# Patient Record
Sex: MALE | Race: OTHER | HISPANIC OR LATINO | URBAN - METROPOLITAN AREA
[De-identification: names, ages, dates, MRNs, and addresses within clinical notes are randomized per-mention and may not be internally consistent; named-entity substitution may affect disease eponyms.]

---

## 2020-10-24 ENCOUNTER — EMERGENCY (EMERGENCY)
Facility: HOSPITAL | Age: 30
LOS: 1 days | Discharge: HOME | End: 2020-10-24
Attending: EMERGENCY MEDICINE | Admitting: EMERGENCY MEDICINE
Payer: MEDICAID

## 2020-10-24 VITALS
SYSTOLIC BLOOD PRESSURE: 109 MMHG | HEART RATE: 84 BPM | TEMPERATURE: 98 F | DIASTOLIC BLOOD PRESSURE: 75 MMHG | OXYGEN SATURATION: 100 % | RESPIRATION RATE: 18 BRPM

## 2020-10-24 PROCEDURE — 99283 EMERGENCY DEPT VISIT LOW MDM: CPT

## 2020-10-24 RX ORDER — IBUPROFEN 200 MG
600 TABLET ORAL ONCE
Refills: 0 | Status: COMPLETED | OUTPATIENT
Start: 2020-10-24 | End: 2020-10-24

## 2020-10-24 RX ORDER — OXYCODONE HYDROCHLORIDE 5 MG/1
1 TABLET ORAL
Qty: 9 | Refills: 0
Start: 2020-10-24 | End: 2020-10-26

## 2020-10-24 RX ORDER — ACETAMINOPHEN 500 MG
975 TABLET ORAL ONCE
Refills: 0 | Status: COMPLETED | OUTPATIENT
Start: 2020-10-24 | End: 2020-10-24

## 2020-10-24 RX ORDER — LIDOCAINE 4 G/100G
1 CREAM TOPICAL ONCE
Refills: 0 | Status: COMPLETED | OUTPATIENT
Start: 2020-10-24 | End: 2020-10-24

## 2020-10-24 RX ORDER — METOCLOPRAMIDE HCL 10 MG
10 TABLET ORAL ONCE
Refills: 0 | Status: DISCONTINUED | OUTPATIENT
Start: 2020-10-24 | End: 2020-10-24

## 2020-10-24 RX ORDER — KETOROLAC TROMETHAMINE 30 MG/ML
15 SYRINGE (ML) INJECTION ONCE
Refills: 0 | Status: DISCONTINUED | OUTPATIENT
Start: 2020-10-24 | End: 2020-10-24

## 2020-10-24 RX ORDER — DIAZEPAM 5 MG
1 TABLET ORAL
Qty: 9 | Refills: 0
Start: 2020-10-24 | End: 2020-10-26

## 2020-10-24 RX ORDER — SODIUM CHLORIDE 9 MG/ML
1000 INJECTION INTRAMUSCULAR; INTRAVENOUS; SUBCUTANEOUS ONCE
Refills: 0 | Status: DISCONTINUED | OUTPATIENT
Start: 2020-10-24 | End: 2020-10-24

## 2020-10-24 RX ADMIN — LIDOCAINE 1 PATCH: 4 CREAM TOPICAL at 10:10

## 2020-10-24 RX ADMIN — Medication 600 MILLIGRAM(S): at 10:09

## 2020-10-24 RX ADMIN — Medication 975 MILLIGRAM(S): at 10:09

## 2020-10-24 NOTE — ED PROVIDER NOTE - PATIENT PORTAL LINK FT
You can access the FollowMyHealth Patient Portal offered by [facility] by registering at the following website: [email or web address]. By joining [employer]’s FollowMyHealth portal, you will also be able to view your health information using other applications (apps) compatible with our system.

## 2020-10-24 NOTE — ED PROVIDER NOTE - PROGRESS NOTE DETAILS
Junior Saleem MD. pt reassessed. able to ambulate. states headache was much improved; back pain still present but improved. pt to be dsicharged. strict return precautions provided. rx sent for oxy and valium. advised for prn severe pain and not to drive or operate heavy machinery while taking these meds. all questions answered

## 2020-10-24 NOTE — ED PROVIDER NOTE - CARE PLAN
Principal Discharge DX:	Acute bilateral low back pain without sciatica  Secondary Diagnosis:	Headache

## 2020-10-24 NOTE — ED PROVIDER NOTE - CLINICAL SUMMARY MEDICAL DECISION MAKING FREE TEXT BOX
Junior Saleem MD. pt with no significant pmhx presents to ED for atraumatic lower back pain since yesterday. also woke up with HA, gradually worsening throughout day. it is not worse HA of his life. denies red flag sx for back pain; not an IVDU. neuro intact. able to ambulate. likely tension ha and muscle spasm/strain of back. will tx w/ po meds, reassess.

## 2020-10-24 NOTE — ED PROVIDER NOTE - OBJECTIVE STATEMENT
30 M with no significant pmhx presents to ED for b/l lower back pain and frontal HA that began when he woke up yesterday. It was mild at first but gradually worsened throughout the day. Today, pain was not relieved w/ tylenol and thus presents. States his back pain is worse w/ any movements. Denies fall/trauma/injury. Has some nausea and feels pressure behind his eyes. Denies vomiting, fever, neck pain, cough, abd pain, cp, sob, dysuria, hematuria, urinary incontinence, bowel incontinence, urinary retention, ivdu, night sweats, dizziness, syncope, numbness, weakness, tingling, diplopia, blurred vision.

## 2020-10-24 NOTE — ED PROVIDER NOTE - NS ED ROS FT
Constitutional: no fevers; no chills  HEENT: no visual changes, no sore throat, no rhinorrhea  CV: no cp; no palpitations  Resp: no sob; no cough  GI: no abd pain, nausea, no vomiting, no diarrhea, no constipation  : no dysuria, no hematuria  MSK: myalgais; arthralgias  skin: no rashes  neuro: HA, no numbness; no weakness, no tingling  ROS statement: all other ROS negative except as per HPI

## 2020-10-24 NOTE — ED ADULT TRIAGE NOTE - CHIEF COMPLAINT QUOTE
Pt c/o lower back pain radiating down both of his legs, atraumatic since yesterday, accompanied with headache, pt no past medical history, denies dizziness, no dysuria/hematuria.

## 2020-10-24 NOTE — ED PROVIDER NOTE - NSFOLLOWUPINSTRUCTIONS_ED_ALL_ED_FT
You may take Acetaminophen over the counter as needed for pain and/or fever. Use as directed and see medication warnings.  You may take Ibuprofen over the counter as needed for pain and/or fever. Use as directed and see medication warnings.    A prescription for __ and __    Please follow up with your primary care physician.  Please bring a copy of your results with you.  Please return to the emergency department for worsening of your symptoms. You were seen in the emergency department for low back pain and headache.     You may take Acetaminophen over the counter as needed for pain and/or fever. Use as directed and see medication warnings.  You may take Ibuprofen over the counter as needed for pain and/or fever. Use as directed and see medication warnings.    A prescription for Valium and Oxycodone were sent to your pharmacy. Please take it for severe pain. Do not operate heavy machinery, drive, or drink alcohol while taking these medications. Do not take both at the same time.     Please follow up with your primary care physician.    Please return to the emergency department for worsening of your symptoms.

## 2020-10-24 NOTE — ED PROVIDER NOTE - PHYSICAL EXAMINATION
PHYSICAL EXAM:  GENERAL: non-toxic appearing; in no respiratory distress  HEAD Atraumatic, Normocephalic  NECK: No JVD; FROM  EYES: PERRL, EOMs intact b/l w/out deficits; no nystagmus;   CHEST/LUNG: CTAB no wheezes/rhonchi/rales  HEART: RRR no murmur/gallops/rubs  ABDOMEN: +BS, soft, NT, ND  EXTREMITIES: No LE edema, +2 radial pulses b/l, +2 DP/PT pulses b/l  MUSCULOSKELETAL: FROM of all 4 extremities; no midline vertebral tenderness; no stepoffs or deformities; no tender to lower back;   NERVOUS SYSTEM:  A&Ox3, No motor deficits or sensory deficits; CNII-XII intact; no focal neurologic deficits; able to ambulate; no dysmetria; no dysdiadochokinesia; able to ambulate w/o difficulty;   SKIN:  No new rashes

## 2020-10-24 NOTE — ED PROVIDER NOTE - ATTENDING CONTRIBUTION TO CARE
DR. BYRNES, ATTENDING MD-  I performed a face to face bedside interview with the patient regarding history of present illness, review of symptoms and past medical history. I completed an independent physical exam.  I have discussed the patient's plan of care with the resident.   Documentation as above in the note.    31 y/o male with gradual onset low back pain and ha since yesterday.  Similar to episodes in past but worse in severity and prolonged in duration.  No red flags for ha or back pain.  Will tx symptomatically, give ha center and back pain center referral sheets for continued o/p w/u.

## 2023-09-02 ENCOUNTER — EMERGENCY (EMERGENCY)
Facility: HOSPITAL | Age: 33
LOS: 1 days | Discharge: HOME | End: 2023-09-02
Attending: EMERGENCY MEDICINE
Payer: MEDICAID

## 2023-09-02 VITALS
HEIGHT: 69 IN | OXYGEN SATURATION: 99 % | DIASTOLIC BLOOD PRESSURE: 74 MMHG | SYSTOLIC BLOOD PRESSURE: 111 MMHG | TEMPERATURE: 98 F | HEART RATE: 60 BPM | WEIGHT: 130.07 LBS | RESPIRATION RATE: 18 BRPM

## 2023-09-02 PROCEDURE — 99284 EMERGENCY DEPT VISIT MOD MDM: CPT | Mod: 25

## 2023-09-02 PROCEDURE — 73130 X-RAY EXAM OF HAND: CPT | Mod: 26,LT

## 2023-09-02 RX ORDER — IBUPROFEN 200 MG
600 TABLET ORAL ONCE
Refills: 0 | Status: COMPLETED | OUTPATIENT
Start: 2023-09-02 | End: 2023-09-02

## 2023-09-02 NOTE — ED PROVIDER NOTE - OBJECTIVE STATEMENT
33-year-old man presenting for evaluation of left third finger injury.  Was working on a car earlier today when a  jumped off a bolt and hit him in the finger.  He is reporting pain at his proximal interphalangeal joint.  He is reporting ROM is limited secondary to pain.  He has not taken any medications for pain today.  He reports his Tdap is up-to-date within the last 2 years.

## 2023-09-02 NOTE — ED PROVIDER NOTE - CLINICAL SUMMARY MEDICAL DECISION MAKING FREE TEXT BOX
Patient presenting for evaluation of finger injury over the joint.  Will obtain x-ray to screen for underlying fracture.  No obvious joint space involvement but given proximity anticipate discharge on antibiotics to prevent infection.  May require 1 suture for repair.

## 2023-09-02 NOTE — ED PROVIDER NOTE - PATIENT PORTAL LINK FT
You can access the FollowMyHealth Patient Portal offered by [facility] by registering at the following website: [email or web address]. By joining [employer]’s FollowMyHealth portal, you will also be able to view your health information using other applications (apps) compatible with our system.

## 2023-09-02 NOTE — ED ADULT TRIAGE NOTE - CHIEF COMPLAINT QUOTE
Pt stated he was using a  to cut off a bolt off the car and the  slipped and cut 2nd finger on his left hand around 6:30 pm tonight, was seen at urgent care for same c/o and referred to come to the ER.  Dressing in place.

## 2023-09-02 NOTE — ED PROVIDER NOTE - NSFOLLOWUPINSTRUCTIONS_ED_ALL_ED_FT
Thank you for choosing [facility] for your healthcare.    You were seen in the Emergency Department for an injury to your finger.  Here you had an x-ray which did not show any broken bones on our review.  If the radiologist's note a broken bone that we missed we will contact you to let you know of the difference.  We are starting you on antibiotics to try and make sure that there is no infection in the joint underlying the cut that you sustained.  If you start having severe pain in the finger, severe swelling of the joint, fevers please return to the emergency room for reevaluation.  You can also return to the emergency room for any further emergent or concerning medical issues.

## 2023-09-02 NOTE — ED PROVIDER NOTE - PHYSICAL EXAMINATION
Exam:  General: Patient well appearing, vital signs within normal limits  MSK: no deformity of finger, no swelling  Skin: small laceration/avulsion of skin over 3rd PIP, no obvious violation of joint space  Psych: normal mood and affect

## 2023-09-03 PROBLEM — Z78.9 OTHER SPECIFIED HEALTH STATUS: Chronic | Status: ACTIVE | Noted: 2020-10-24

## 2023-09-03 PROCEDURE — 99283 EMERGENCY DEPT VISIT LOW MDM: CPT | Mod: 25

## 2023-09-03 PROCEDURE — 73130 X-RAY EXAM OF HAND: CPT

## 2023-09-03 RX ADMIN — Medication 600 MILLIGRAM(S): at 00:21

## 2023-09-03 RX ADMIN — Medication 1 TABLET(S): at 01:44

## 2023-09-03 NOTE — ED ADULT NURSE NOTE - OBJECTIVE STATEMENT
Pt presents to ED AAOx4, Pt stated he was using a  to cut off a bolt off the car and the  slipped and cut 2nd finger on his left hand around 6:30 pm tonight, was seen at urgent care for same c/o and referred to come to the ER.  Dressing in place.

## 2023-09-03 NOTE — ED ADULT NURSE NOTE - NSFALLUNIVINTERV_ED_ALL_ED
Bed/Stretcher in lowest position, wheels locked, appropriate side rails in place/Call bell, personal items and telephone in reach/Instruct patient to call for assistance before getting out of bed/chair/stretcher/Non-slip footwear applied when patient is off stretcher/[place] to call system/Physically safe environment - no spills, clutter or unnecessary equipment/Purposeful proactive rounding/Room/bathroom lighting operational, light cord in reach

## 2025-02-03 ENCOUNTER — EMERGENCY (EMERGENCY)
Facility: HOSPITAL | Age: 35
LOS: 1 days | Discharge: HOME | End: 2025-02-03
Admitting: STUDENT IN AN ORGANIZED HEALTH CARE EDUCATION/TRAINING PROGRAM
Payer: MEDICAID

## 2025-02-03 VITALS
OXYGEN SATURATION: 99 % | HEART RATE: 114 BPM | RESPIRATION RATE: 20 BRPM | WEIGHT: 132.06 LBS | TEMPERATURE: 103 F | SYSTOLIC BLOOD PRESSURE: 110 MMHG | DIASTOLIC BLOOD PRESSURE: 69 MMHG

## 2025-02-03 VITALS
HEART RATE: 83 BPM | DIASTOLIC BLOOD PRESSURE: 59 MMHG | SYSTOLIC BLOOD PRESSURE: 92 MMHG | RESPIRATION RATE: 19 BRPM | TEMPERATURE: 98 F | OXYGEN SATURATION: 99 %

## 2025-02-03 LAB
ALBUMIN SERPL ELPH-MCNC: 4 G/DL — SIGNIFICANT CHANGE UP (ref 3.3–5)
ALP SERPL-CCNC: 45 U/L — SIGNIFICANT CHANGE UP (ref 40–120)
ALT FLD-CCNC: 37 U/L — SIGNIFICANT CHANGE UP (ref 4–41)
ANION GAP SERPL CALC-SCNC: 14 MMOL/L — SIGNIFICANT CHANGE UP (ref 7–14)
AST SERPL-CCNC: 70 U/L — HIGH (ref 4–40)
BASOPHILS # BLD AUTO: 0.02 K/UL — SIGNIFICANT CHANGE UP (ref 0–0.2)
BASOPHILS NFR BLD AUTO: 0.2 % — SIGNIFICANT CHANGE UP (ref 0–2)
BILIRUB SERPL-MCNC: 0.3 MG/DL — SIGNIFICANT CHANGE UP (ref 0.2–1.2)
BUN SERPL-MCNC: 14 MG/DL — SIGNIFICANT CHANGE UP (ref 7–23)
CALCIUM SERPL-MCNC: 8.9 MG/DL — SIGNIFICANT CHANGE UP (ref 8.4–10.5)
CHLORIDE SERPL-SCNC: 102 MMOL/L — SIGNIFICANT CHANGE UP (ref 98–107)
CO2 SERPL-SCNC: 20 MMOL/L — LOW (ref 22–31)
CREAT SERPL-MCNC: 1.3 MG/DL — SIGNIFICANT CHANGE UP (ref 0.5–1.3)
EGFR: 74 ML/MIN/1.73M2 — SIGNIFICANT CHANGE UP
EOSINOPHIL # BLD AUTO: 0 K/UL — SIGNIFICANT CHANGE UP (ref 0–0.5)
EOSINOPHIL NFR BLD AUTO: 0 % — SIGNIFICANT CHANGE UP (ref 0–6)
FLUAV AG NPH QL: DETECTED
FLUBV AG NPH QL: SIGNIFICANT CHANGE UP
GLUCOSE SERPL-MCNC: 111 MG/DL — HIGH (ref 70–99)
HCT VFR BLD CALC: 41.6 % — SIGNIFICANT CHANGE UP (ref 39–50)
HGB BLD-MCNC: 14.1 G/DL — SIGNIFICANT CHANGE UP (ref 13–17)
IANC: 9.37 K/UL — HIGH (ref 1.8–7.4)
IMM GRANULOCYTES NFR BLD AUTO: 0.3 % — SIGNIFICANT CHANGE UP (ref 0–0.9)
LYMPHOCYTES # BLD AUTO: 1.3 K/UL — SIGNIFICANT CHANGE UP (ref 1–3.3)
LYMPHOCYTES # BLD AUTO: 11 % — LOW (ref 13–44)
MCHC RBC-ENTMCNC: 32.1 PG — SIGNIFICANT CHANGE UP (ref 27–34)
MCHC RBC-ENTMCNC: 33.9 G/DL — SIGNIFICANT CHANGE UP (ref 32–36)
MCV RBC AUTO: 94.8 FL — SIGNIFICANT CHANGE UP (ref 80–100)
MONOCYTES # BLD AUTO: 1.1 K/UL — HIGH (ref 0–0.9)
MONOCYTES NFR BLD AUTO: 9.3 % — SIGNIFICANT CHANGE UP (ref 2–14)
NEUTROPHILS # BLD AUTO: 9.37 K/UL — HIGH (ref 1.8–7.4)
NEUTROPHILS NFR BLD AUTO: 79.2 % — HIGH (ref 43–77)
NRBC # BLD AUTO: 0 K/UL — SIGNIFICANT CHANGE UP (ref 0–0)
NRBC # BLD: 0 /100 WBCS — SIGNIFICANT CHANGE UP (ref 0–0)
NRBC # FLD: 0 K/UL — SIGNIFICANT CHANGE UP (ref 0–0)
NRBC BLD-RTO: 0 /100 WBCS — SIGNIFICANT CHANGE UP (ref 0–0)
PLATELET # BLD AUTO: 234 K/UL — SIGNIFICANT CHANGE UP (ref 150–400)
POTASSIUM SERPL-MCNC: 4.4 MMOL/L — SIGNIFICANT CHANGE UP (ref 3.5–5.3)
POTASSIUM SERPL-SCNC: 4.4 MMOL/L — SIGNIFICANT CHANGE UP (ref 3.5–5.3)
PROT SERPL-MCNC: 7.1 G/DL — SIGNIFICANT CHANGE UP (ref 6–8.3)
RBC # BLD: 4.39 M/UL — SIGNIFICANT CHANGE UP (ref 4.2–5.8)
RBC # FLD: 12.7 % — SIGNIFICANT CHANGE UP (ref 10.3–14.5)
RSV RNA NPH QL NAA+NON-PROBE: SIGNIFICANT CHANGE UP
SARS-COV-2 RNA SPEC QL NAA+PROBE: SIGNIFICANT CHANGE UP
SODIUM SERPL-SCNC: 136 MMOL/L — SIGNIFICANT CHANGE UP (ref 135–145)
WBC # BLD: 11.83 K/UL — HIGH (ref 3.8–10.5)
WBC # FLD AUTO: 11.83 K/UL — HIGH (ref 3.8–10.5)

## 2025-02-03 PROCEDURE — 99284 EMERGENCY DEPT VISIT MOD MDM: CPT

## 2025-02-03 PROCEDURE — 93010 ELECTROCARDIOGRAM REPORT: CPT

## 2025-02-03 RX ORDER — BACTERIOSTATIC SODIUM CHLORIDE 0.9 %
1000 VIAL (ML) INJECTION ONCE
Refills: 0 | Status: COMPLETED | OUTPATIENT
Start: 2025-02-03 | End: 2025-02-03

## 2025-02-03 RX ORDER — ACETAMINOPHEN 160 MG/5ML
1000 SUSPENSION ORAL ONCE
Refills: 0 | Status: COMPLETED | OUTPATIENT
Start: 2025-02-03 | End: 2025-02-03

## 2025-02-03 RX ORDER — KETOROLAC TROMETHAMINE 10 MG
15 TABLET ORAL ONCE
Refills: 0 | Status: DISCONTINUED | OUTPATIENT
Start: 2025-02-03 | End: 2025-02-03

## 2025-02-03 RX ADMIN — Medication 15 MILLIGRAM(S): at 19:43

## 2025-02-03 RX ADMIN — Medication 1000 MILLILITER(S): at 19:42

## 2025-02-03 RX ADMIN — ACETAMINOPHEN 400 MILLIGRAM(S): 160 SUSPENSION ORAL at 19:44

## 2025-02-03 NOTE — ED PROVIDER NOTE - NSFOLLOWUPINSTRUCTIONS_ED_ALL_ED_FT
You have been diagnosed with influenza (the flu). These instructions will help you manage your symptoms and prevent the spread of the flu to others.    Medications:    Fever and Pain Relief: You can take over-the-counter medications such as acetaminophen (Tylenol) or ibuprofen (Advil, Motrin) to reduce fever and relieve muscle aches. Follow the dosage instructions on the label. Do not give aspirin to children or teenagers.  Antiviral Medications: If prescribed an antiviral medication (e.g., Tamiflu), take it exactly as directed. These medications can shorten the duration of the flu and reduce the severity of symptoms, but they work best when started within 48 hours of symptom onset.  Symptom Management:    Rest: Get plenty of rest. This is crucial for your recovery.  Hydration: Drink plenty of fluids, such as water, clear broths, and electrolyte solutions, to prevent dehydration.  Sore Throat Relief: Gargle with warm salt water (1/2 teaspoon salt in 8 ounces warm water) or use throat lozenges.  Congestion Relief: Use a cool-mist humidifier or take a hot shower to help loosen congestion. Saline nasal drops or sprays can also be helpful.  Infection Control:    Handwashing: Wash your hands frequently with soap and water for at least 20 seconds, especially after coughing, sneezing, or blowing your nose.  Respiratory Etiquette: Cover your mouth and nose with a tissue when coughing or sneezing, then throw the tissue away. If you don't have a tissue, cough or sneeze into your elbow.  Isolation: Stay home from work, school, and other public places for at least 24 hours after your fever breaks without the use of fever-reducing medication. This helps prevent spreading the flu to others. Avoid close contact with others as much as possible.  Disinfect: Clean frequently touched surfaces in your home, such as doorknobs, light switches, and countertops, with a disinfectant.  Diet:    Nutritious Foods: Eat nutritious foods as tolerated. Focus on easy-to-digest foods if you have nausea or vomiting.  Follow-up Care:    Contact Your Doctor: Contact your doctor if your symptoms worsen or if you experience any of the following:  Difficulty breathing or shortness of breath  Chest pain or pressure  Sudden dizziness  Confusion  Severe or persistent vomiting  Signs of dehydration (decreased urination, dry mouth, dizziness)  Fever that returns after it has gone away  What to Expect:    Recovery Time: Most people recover from the flu within a week to two weeks. Fatigue can persist for several weeks.

## 2025-02-03 NOTE — ED PROVIDER NOTE - OBJECTIVE STATEMENT
34-year-old male no reported past medical history presents emergency department fevers chills body aches weakness decreased appetite x 4 days.  Patient has been taking Advil with temporary relief of his symptoms.  Patient denies any known sick contacts or recent travel.  Patient denies chest pain shortness of breath abdominal pain nausea vomiting diarrhea headache dizziness.

## 2025-02-03 NOTE — ED ADULT TRIAGE NOTE - CHIEF COMPLAINT QUOTE
Patient reports fever, chills, body aches, weakness and decreased appetite since Friday. Patient reports taking Advil at 1pm with no relief, patient noted to be shivering in triage. Patient also complains of back pain, denies any burning or pain on urination. No medical history.

## 2025-02-03 NOTE — ED PROVIDER NOTE - PATIENT PORTAL LINK FT
You can access the FollowMyHealth Patient Portal offered by [facility] by registering at the following website: [email or web address]. By joining [employer]’s FollowMyHealth portal, you will also be able to view your health information using other applications (apps) compatible with our system.

## 2025-02-03 NOTE — ED PROVIDER NOTE - CLINICAL SUMMARY MEDICAL DECISION MAKING FREE TEXT BOX
34-year-old male no reported past medical history presents emergency department fevers chills body aches weakness decreased appetite x 4 days.  Patient has been taking Advil with temporary relief of his symptoms.  Patient denies any known sick contacts or recent travel.  Patient denies chest pain shortness of breath abdominal pain nausea vomiting diarrhea headache dizziness.    likely viral syndrome    plan  - labs  - flu swab  - ivf  - tylenol/toradol  - reassess